# Patient Record
Sex: FEMALE | Race: WHITE | Employment: UNEMPLOYED | URBAN - METROPOLITAN AREA
[De-identification: names, ages, dates, MRNs, and addresses within clinical notes are randomized per-mention and may not be internally consistent; named-entity substitution may affect disease eponyms.]

---

## 2024-05-28 ENCOUNTER — OFFICE VISIT (OUTPATIENT)
Dept: OBGYN CLINIC | Facility: CLINIC | Age: 54
End: 2024-05-28
Payer: MEDICARE

## 2024-05-28 DIAGNOSIS — N95.1 PERIMENOPAUSAL SYMPTOMS: Primary | ICD-10-CM

## 2024-05-28 DIAGNOSIS — M25.50 ARTHRALGIA, UNSPECIFIED JOINT: ICD-10-CM

## 2024-05-28 DIAGNOSIS — R53.82 CHRONIC FATIGUE: ICD-10-CM

## 2024-05-28 PROBLEM — F32.A ANXIETY AND DEPRESSION: Status: ACTIVE | Noted: 2023-10-09

## 2024-05-28 PROBLEM — J30.9 ALLERGIC RHINITIS: Status: ACTIVE | Noted: 2023-10-09

## 2024-05-28 PROBLEM — F41.9 ANXIETY AND DEPRESSION: Status: ACTIVE | Noted: 2023-10-09

## 2024-05-28 PROCEDURE — 99205 OFFICE O/P NEW HI 60 MIN: CPT | Performed by: OBSTETRICS & GYNECOLOGY

## 2024-05-28 RX ORDER — LISINOPRIL 10 MG/1
TABLET ORAL
COMMUNITY
Start: 2023-10-09

## 2024-05-28 RX ORDER — SERTRALINE HYDROCHLORIDE 25 MG/1
TABLET, FILM COATED ORAL
COMMUNITY

## 2024-05-28 RX ORDER — VONOPRAZAN FUMARATE 26.72 MG/1
TABLET ORAL
COMMUNITY
Start: 2024-02-01

## 2024-05-28 RX ORDER — METRONIDAZOLE 7.5 MG/G
GEL VAGINAL
COMMUNITY
Start: 2024-03-21

## 2024-05-28 RX ORDER — IPRATROPIUM BROMIDE 21 UG/1
1-2 SPRAY, METERED NASAL 3 TIMES DAILY
COMMUNITY
Start: 2023-12-14

## 2024-05-28 RX ORDER — HYDROCHLOROTHIAZIDE 25 MG/1
25 TABLET ORAL DAILY
COMMUNITY

## 2024-05-28 RX ORDER — TIZANIDINE 4 MG/1
4 TABLET ORAL EVERY 8 HOURS PRN
COMMUNITY
Start: 2024-02-22

## 2024-05-28 RX ORDER — OXYCODONE AND ACETAMINOPHEN 10; 325 MG/1; MG/1
TABLET ORAL
COMMUNITY

## 2024-05-28 RX ORDER — POTASSIUM CHLORIDE 600 MG/1
8 CAPSULE, EXTENDED RELEASE ORAL DAILY
COMMUNITY

## 2024-05-31 NOTE — PROGRESS NOTES
Assessment/Plan:      Diagnoses and all orders for this visit:    Perimenopausal symptoms  -     FSH and LH; Future  -     Estradiol; Future  -     Progesterone; Future  -     DHEA-sulfate; Future  -     Testosterone; Future  -     Cortisol Level, AM Specimen; Future    Chronic fatigue    Arthralgia, unspecified joint    Other orders  -     hydroCHLOROthiazide 25 mg tablet; Take 25 mg by mouth daily  -     ipratropium (ATROVENT) 0.03 % nasal spray; 1-2 sprays into each nostril Three times a day  -     lisinopril (ZESTRIL) 10 mg tablet  -     metroNIDAZOLE (METROGEL) 0.75 % vaginal gel; PLACE 1 APPLICATORFUL VAGINALLY FOR 5 NIGHTS THEN CONTINUE 1 APPLICATORUL FOR 2 WEEKS  -     oxyCODONE-acetaminophen (PERCOCET)  mg per tablet; take 1 tablet by mouth four times a day ONGOING THERAPY  -     potassium chloride (MICRO-K) 8 mEq CR capsule; Take 8 mEq by mouth daily  -     sertraline (ZOLOFT) 25 mg tablet; take 2 and 1/2 tablets by mouth once daily  -     tiZANidine (ZANAFLEX) 4 mg tablet; Take 4 mg by mouth every 8 (eight) hours as needed for muscle spasms  -     Voquezna 20 MG TABS; Take by mouth        1) This was a lengthy visit spent discussing the HPATG (hypothalamus/pituitary/adrenal/thyroid/gonadal) axis and the impact that hormonal deviation in one gland can have on another. It is not uncommon for ovarian declined to coincide or invoke thyroid and adrenal dysfunction as well.  2)  Discussed the wide and varied hormonal fluctuations associated with the perimenopausal time frame, often resulting in estrogen dominance and relative progesterone deficiency. This results in menorrhagia, uterine cellular growth with fibroids, endometriosis, breast density along with progesterone loss symptoms including sleep and anxiety or mood.   3) I'm not sure estradiol is even needed yet as I am not convinced she is menopausal at all. None of these symptoms are classic for menopausal hormone decline, but more dietary,  "inflammatory issues. Recommend comprehensive testing of ovarian and adrenal axes off divigel for one week, hold on 24 hr salivary cortisol testing for now. Thyroid axis already tested and normal.   4) What she really needs is progesterone, with or without Estradiol., I still recommend progesterone replacement for hysterectomized women for breast protection against breast cancer as well as fantastic symptom resolution of sleep disturbance, mood, anxiety and agitation. She inquires about testosterone replacement as well. Testosterone supplementation discussed in detail, including lack of FDA approval for women and cost concerns, as insurance will not reimburse. Side effects include: increased libido, increased muscle mass, decreased fat mass, erythrocytosis ( NOT polycythemia rubra), increased energy, acne, increased hair growth or loss.  5) I will email her with results and determine treatment options. A follow up may be prudent to discuss other issues such as nutrition and other lifestyle factors to aid in GI function and pain control. I did not yet get into these domains.     This was a 60 minute visit with greater than 50% of time spent in face to face counseling and coordination of care.    Subjective:     Patient ID: Diamond Britt is a 53 y.o. female.    Serena presents for hormonal consult, her first visit with me; self referred, sees [facility] for gyn care.  Serena's menopausal status is uncertain, s/p TLH for fibroids in 2020, ovaries conserved.  Over the past 2 years complains of:  1) Increased fatigue, no motivation to do anything  2) Diffuse muscle aches and joint pain. Hx of carpal tunnerl, torn hip labrum, now frozen shoulder issues  3) GERD  4) NHAN  5) Recurrent vaginal bacterial infections.   Was started on Divigel 5 months ago, FSH testing in 2022 in perimenopausal range at 28. Only uses a tiny bit, still feels \"off\".   PMXH: cervical stenosis, DJD; migraines; pelvic floor dysfunction.   SHX: denies tobacco, " ETOH  FHX: Mom HTN, DM2, COD AMI 78. MGM DM2; MGF unknown. Dad COD hip fracture, COPD. PGM liver CA. PGF old age. 2 brothers, one COD Kidney Ca age 55. 2 kids, 35 and 29, healthy.       Review of Systems   Constitutional:  Positive for fatigue and unexpected weight change.   Respiratory:  Positive for apnea.    Gastrointestinal:         GERD   Endocrine: Negative.    Genitourinary:  Positive for vaginal discharge.   Musculoskeletal:  Positive for arthralgias, myalgias and neck pain.   Skin: Negative.    Neurological:  Positive for headaches.   Psychiatric/Behavioral: Negative.         Objective:     Physical Exam

## 2024-06-04 DIAGNOSIS — N95.1 MENOPAUSAL SYMPTOMS: Primary | ICD-10-CM

## 2024-06-04 RX ORDER — PROGESTERONE 200 MG/1
200 CAPSULE ORAL
Qty: 30 CAPSULE | Refills: 11 | Status: SHIPPED | OUTPATIENT
Start: 2024-06-04 | End: 2024-06-06 | Stop reason: ALTCHOICE

## 2024-06-06 DIAGNOSIS — N95.1 PERIMENOPAUSAL SYMPTOMS: Primary | ICD-10-CM

## 2024-07-22 DIAGNOSIS — N95.1 MENOPAUSAL SYMPTOMS: Primary | ICD-10-CM

## 2024-07-22 RX ORDER — ESTRADIOL 0.1 MG/G
CREAM VAGINAL
COMMUNITY

## 2024-07-22 RX ORDER — ESTRADIOL 0.25 MG/.25G
GEL TOPICAL
COMMUNITY
Start: 2024-02-01 | End: 2024-07-22 | Stop reason: DRUGHIGH

## 2024-07-22 RX ORDER — ESTRADIOL 0.5 MG/.5G
1 GEL TOPICAL DAILY
Qty: 30 EACH | Refills: 11 | Status: SHIPPED | OUTPATIENT
Start: 2024-07-22

## 2024-08-20 DIAGNOSIS — N95.1 MENOPAUSAL SYMPTOMS: Primary | ICD-10-CM

## 2024-08-20 RX ORDER — PROGESTERONE 100 MG/1
100 CAPSULE ORAL
Qty: 30 CAPSULE | Refills: 11 | Status: SHIPPED | OUTPATIENT
Start: 2024-08-20

## 2024-08-27 ENCOUNTER — TELEPHONE (OUTPATIENT)
Age: 54
End: 2024-08-27

## 2024-09-04 NOTE — TELEPHONE ENCOUNTER
Attempted to reach patient to schedule follow up for October/November, left detailed voicemail per communication consent.

## 2024-09-14 DIAGNOSIS — N95.1 MENOPAUSAL SYMPTOMS: ICD-10-CM

## 2024-09-16 RX ORDER — PROGESTERONE 100 MG/1
1 CAPSULE ORAL
Qty: 90 CAPSULE | Refills: 4 | Status: SHIPPED | OUTPATIENT
Start: 2024-09-16

## 2024-09-30 ENCOUNTER — TELEPHONE (OUTPATIENT)
Age: 54
End: 2024-09-30

## 2024-09-30 NOTE — TELEPHONE ENCOUNTER
Attempted to reach patient to schedule follow up for patient at end of November, left voicemail per communication consent.

## 2024-10-01 ENCOUNTER — TELEPHONE (OUTPATIENT)
Age: 54
End: 2024-10-01

## 2024-10-28 DIAGNOSIS — N95.1 MENOPAUSAL SYMPTOMS: ICD-10-CM

## 2024-10-28 RX ORDER — ESTRADIOL 0.5 MG/.5G
1 GEL TOPICAL DAILY
Qty: 90 EACH | Refills: 1 | Status: SHIPPED | OUTPATIENT
Start: 2024-10-28

## 2024-12-03 ENCOUNTER — TELEMEDICINE (OUTPATIENT)
Age: 54
End: 2024-12-03
Payer: MEDICARE

## 2024-12-03 DIAGNOSIS — R53.82 CHRONIC FATIGUE: ICD-10-CM

## 2024-12-03 DIAGNOSIS — N95.1 PERIMENOPAUSAL SYMPTOMS: ICD-10-CM

## 2024-12-03 DIAGNOSIS — R68.82 DECREASED LIBIDO: Primary | ICD-10-CM

## 2024-12-03 PROBLEM — G43.719 INTRACTABLE CHRONIC MIGRAINE WITHOUT AURA AND WITHOUT STATUS MIGRAINOSUS: Chronic | Status: ACTIVE | Noted: 2018-10-18

## 2024-12-03 PROBLEM — M51.26 PROTRUSION OF LUMBAR INTERVERTEBRAL DISC: Status: ACTIVE | Noted: 2023-10-09

## 2024-12-03 PROBLEM — F32.5 MAJOR DEPRESSIVE DISORDER WITH SINGLE EPISODE, IN REMISSION (HCC): Status: ACTIVE | Noted: 2022-01-19

## 2024-12-03 PROBLEM — I10 HYPERTENSION, ESSENTIAL: Status: ACTIVE | Noted: 2023-10-09

## 2024-12-03 PROBLEM — G47.33 OSA (OBSTRUCTIVE SLEEP APNEA): Status: ACTIVE | Noted: 2023-03-14

## 2024-12-03 PROBLEM — R13.13 PHARYNGEAL DYSPHAGIA: Status: ACTIVE | Noted: 2019-07-22

## 2024-12-03 PROBLEM — M50.20 PROTRUSION OF CERVICAL INTERVERTEBRAL DISC: Status: ACTIVE | Noted: 2023-10-09

## 2024-12-03 PROCEDURE — 99215 OFFICE O/P EST HI 40 MIN: CPT | Performed by: OBSTETRICS & GYNECOLOGY

## 2024-12-03 RX ORDER — FAMOTIDINE 10 MG
10 TABLET ORAL 2 TIMES DAILY
COMMUNITY

## 2024-12-03 RX ORDER — ELETRIPTAN HYDROBROMIDE 40 MG/1
40 TABLET, FILM COATED ORAL
COMMUNITY
Start: 2014-05-27

## 2024-12-08 NOTE — PROGRESS NOTES
Virtual Regular Visit  Name: Diamond Britt      : 1970      MRN: [id number]  Encounter Provider: Nicolette Bearden MD  Encounter Date: 12/3/2024   Encounter department: [facility]      Verification of patient location:  Patient is located at Home in the following state in which I hold an active license [place] :  Assessment & Plan  Decreased libido    Orders:    other medication, see sig,; Medication/product name: progesterone 100 mg/ testosterone 10 mg/ml  Strength: as above  Sig (include dose, route, frequency): apply 0.5 ml to labia daily    Perimenopausal symptoms         Chronic fatigue         Decreased libido    Orders:    other medication, see sig,; Medication/product name: progesterone 100 mg/ testosterone 10 mg/ml  Strength: as above  Sig (include dose, route, frequency): apply 0.5 ml to labia daily    Perimenopausal symptoms         Chronic fatigue         1) Her adrenal precursors are still likely low, consider topical testosterone instead of DHEA. Testosterone supplementation discussed in detail, including lack of FDA approval for women and cost concerns, as insurance will not reimburse. Side effects include: increased libido, increased muscle mass, decreased fat mass, erythrocytosis ( NOT polycythemia rubra), increased energy, acne, increased hair growth or loss.  2) I can put progesterone in the same cream as testosterone to mitigate GERD symptoms as well! She liked this idea. Compounded cream progesterone 50 mg/ testosterone 5 mg/day issued. I reminded her that testosterone will take 2-3 months to build as she is so depleted, so need to have patience with this.   3) Other supplements for libido can be tried, but these are not as effective in my clinical experience as testosterone, and will not affect bone and muscle mass like testosterone can. Hold for now.   4) Keep estradiol dose the same  5) Email me with monthly progress reports until feeling better. If  not better in 3 months, may need labs and follow up OV. Follow up prn or one year.     This was a 40 minute visit with greater than 50% of time spent in face to face counseling and coordination of care.      Encounter provider Nicolette Bearden MD    The patient was identified by name and date of birth. Diamond Britt was informed that this is a telemedicine visit and that the visit is being conducted through the Epic Embedded platform. She agrees to proceed..  My office door was closed. No one else was in the room.  She acknowledged consent and understanding of privacy and security of the video platform. The patient has agreed to participate and understands they can discontinue the visit at any time.    Patient is aware this is a billable service.       Serena returns for hormone consult follow up. I saw her 6 months ago with complaints of; perimenopausal hot flash, fatigue, just felt off. S/p TLH, was given divigel E2 but did not feel well on this so stopped.   Labs indicated perimenoapausal status. Adrenal testing revealed very low testosterone and DHEA.   I recommended increasing divigel, adding progesterone 100 mg, DHEA 10 mg daily. Since then:  1) Fatigue persists  2) Muscle mass has decreased. Did not tolerate DHEA due to mood agitation, GERD  3) Hot flash mildly helped  4) Libido is poor   NO other change in health status or family history.  Finds that oral meds aggravate her GERD and thinks the PG is doing the same.     Review of Systems   Constitutional:  Positive for fatigue and unexpected weight change.   Respiratory:  Positive for apnea.         NHAN   Gastrointestinal:         GERD   Endocrine: Positive for heat intolerance.   Genitourinary:         Decreased libido   Musculoskeletal:  Positive for arthralgias and back pain.   Psychiatric/Behavioral:  Positive for agitation and sleep disturbance.

## 2025-01-02 DIAGNOSIS — R68.82 DECREASED LIBIDO: ICD-10-CM

## 2025-01-02 DIAGNOSIS — N95.1 MENOPAUSAL SYMPTOMS: Primary | ICD-10-CM

## 2025-01-02 RX ORDER — ESTRADIOL 0.25 MG/.25G
1 GEL TOPICAL DAILY
Qty: 30 EACH | Refills: 11 | Status: SHIPPED | OUTPATIENT
Start: 2025-01-02

## 2025-01-23 DIAGNOSIS — N95.1 MENOPAUSAL SYMPTOMS: ICD-10-CM

## 2025-01-23 RX ORDER — ESTRADIOL 0.25 MG/.25G
1 GEL TOPICAL DAILY
Qty: 30 EACH | Refills: 11 | Status: SHIPPED | OUTPATIENT
Start: 2025-01-23 | End: 2025-01-24

## 2025-01-24 RX ORDER — ESTRADIOL 0.25 MG/.25G
GEL TOPICAL
Qty: 90 EACH | Refills: 1 | Status: SHIPPED | OUTPATIENT
Start: 2025-01-24

## 2025-01-28 DIAGNOSIS — N95.1 MENOPAUSAL SYMPTOMS: Primary | ICD-10-CM

## 2025-01-28 RX ORDER — ESTRADIOL 0.5 MG/.5G
1 GEL TOPICAL DAILY
Qty: 30 EACH | Refills: 11 | Status: SHIPPED | OUTPATIENT
Start: 2025-01-28

## 2025-02-21 DIAGNOSIS — R68.82 DECREASED LIBIDO: ICD-10-CM

## 2025-02-21 DIAGNOSIS — N95.1 MENOPAUSAL SYMPTOMS: Primary | ICD-10-CM

## 2025-02-24 DIAGNOSIS — N95.1 MENOPAUSAL SYMPTOMS: ICD-10-CM

## 2025-02-25 RX ORDER — ESTRADIOL 0.5 MG/.5G
1 GEL TOPICAL DAILY
Qty: 100 EACH | Refills: 5 | Status: SHIPPED | OUTPATIENT
Start: 2025-02-25

## 2025-04-07 DIAGNOSIS — N95.1 MENOPAUSAL SYMPTOMS: Primary | ICD-10-CM

## 2025-05-07 DIAGNOSIS — N95.1 MENOPAUSAL SYMPTOMS: Primary | ICD-10-CM

## 2025-05-07 DIAGNOSIS — R68.82 DECREASED LIBIDO: ICD-10-CM

## 2025-05-07 RX ORDER — ESTRADIOL 0.75 MG/.75G
0.75 GEL TOPICAL DAILY
Qty: 30 EACH | Refills: 11 | Status: SHIPPED | OUTPATIENT
Start: 2025-05-07

## 2025-06-12 DIAGNOSIS — N95.1 MENOPAUSAL SYMPTOMS: ICD-10-CM

## 2025-06-13 DIAGNOSIS — N95.1 MENOPAUSAL SYMPTOMS: ICD-10-CM
